# Patient Record
Sex: FEMALE | Race: WHITE | HISPANIC OR LATINO | URBAN - METROPOLITAN AREA
[De-identification: names, ages, dates, MRNs, and addresses within clinical notes are randomized per-mention and may not be internally consistent; named-entity substitution may affect disease eponyms.]

---

## 2024-09-10 ENCOUNTER — APPOINTMENT (RX ONLY)
Dept: URBAN - METROPOLITAN AREA CLINIC 144 | Facility: CLINIC | Age: 29
Setting detail: DERMATOLOGY
End: 2024-09-10

## 2024-09-10 DIAGNOSIS — L24 IRRITANT CONTACT DERMATITIS: ICD-10-CM

## 2024-09-10 DIAGNOSIS — L30.4 ERYTHEMA INTERTRIGO: ICD-10-CM

## 2024-09-10 PROBLEM — L30.9 DERMATITIS, UNSPECIFIED: Status: ACTIVE | Noted: 2024-09-10

## 2024-09-10 PROBLEM — L24.9 IRRITANT CONTACT DERMATITIS, UNSPECIFIED CAUSE: Status: ACTIVE | Noted: 2024-09-10

## 2024-09-10 PROCEDURE — ? PRESCRIPTION

## 2024-09-10 PROCEDURE — 99203 OFFICE O/P NEW LOW 30 MIN: CPT

## 2024-09-10 PROCEDURE — ? COUNSELING

## 2024-09-10 PROCEDURE — ? GENTLE SKIN CARE INSTRUCTIONS

## 2024-09-10 PROCEDURE — ? PRESCRIPTION MEDICATION MANAGEMENT

## 2024-09-10 RX ORDER — HYDROCORTISONE 25 MG/ML
LOTION TOPICAL
Qty: 118 | Refills: 0 | Status: ACTIVE

## 2024-09-10 ASSESSMENT — LOCATION DETAILED DESCRIPTION DERM
LOCATION DETAILED: RIGHT AXILLARY TAIL OF BREAST
LOCATION DETAILED: LEFT PERIAREOLAR BREAST 1-2:00 REGION
LOCATION DETAILED: LEFT RIB CAGE

## 2024-09-10 ASSESSMENT — LOCATION SIMPLE DESCRIPTION DERM
LOCATION SIMPLE: LEFT BREAST
LOCATION SIMPLE: ABDOMEN
LOCATION SIMPLE: RIGHT BREAST

## 2024-09-10 ASSESSMENT — LOCATION ZONE DERM: LOCATION ZONE: TRUNK

## 2024-09-10 NOTE — PROCEDURE: COUNSELING
Detail Level: Detailed
Cleanser Recommendations: Dove Bar soap\\Niles Free and clear Laundry detergent
Moisturizer Recommendations: Cerave itch relief daily to semi wet skin
Patient Specific Counseling (Will Not Stick From Patient To Patient): Continue under monthly care of OB GYN and inform if unresponsive or recurrences; patient states OB aware and does not feel any further diagnostics needed

## 2024-09-10 NOTE — PROCEDURE: GENTLE SKIN CARE INSTRUCTIONS
Detail Level: Zone
Gentle Skin Care Counseling: I recommended use a gentle skin cleanser ( Dove soap or Cerave gentle cleanser)  when washing the skin. I also recommended application of a moisturizer to wet skin once daily after bathing to  twice daily. Products with fragrances, preservatives and dyes should be avoided. Continue gentle non fragrant deodorant presently using as opposed to previous scented deodorant.

## 2024-09-10 NOTE — PROCEDURE: PRESCRIPTION MEDICATION MANAGEMENT
Continue Regimen: Per approved already by OB: Clotrimazole and betamethasone 1%-5% cream PRN (given to pt by OBGYN) when flares max 7 day intervals\\nPatient states only using for max 2 consecutive days
Detail Level: Zone
Plan: Recommended to have the area as dry as possible. Also to use otc powder
Render In Strict Bullet Format?: No
Initiate Treatment: hydrocortisone 2.5 % lotion: Apply to aa on left breast bid x maximum one  week prn then dc; May repeat prn flares